# Patient Record
Sex: FEMALE | Race: WHITE | NOT HISPANIC OR LATINO
[De-identification: names, ages, dates, MRNs, and addresses within clinical notes are randomized per-mention and may not be internally consistent; named-entity substitution may affect disease eponyms.]

---

## 2024-02-13 PROBLEM — Z00.00 ENCOUNTER FOR PREVENTIVE HEALTH EXAMINATION: Status: ACTIVE | Noted: 2024-02-13

## 2024-02-14 ENCOUNTER — APPOINTMENT (OUTPATIENT)
Dept: SURGERY | Facility: CLINIC | Age: 74
End: 2024-02-14
Payer: MEDICARE

## 2024-02-14 VITALS
WEIGHT: 120 LBS | TEMPERATURE: 98 F | RESPIRATION RATE: 16 BRPM | DIASTOLIC BLOOD PRESSURE: 78 MMHG | SYSTOLIC BLOOD PRESSURE: 122 MMHG | HEART RATE: 87 BPM | HEIGHT: 66 IN | BODY MASS INDEX: 19.29 KG/M2 | OXYGEN SATURATION: 95 %

## 2024-02-14 DIAGNOSIS — K81.1 CHRONIC CHOLECYSTITIS: ICD-10-CM

## 2024-02-14 PROCEDURE — 99203 OFFICE O/P NEW LOW 30 MIN: CPT

## 2024-02-14 NOTE — REVIEW OF SYSTEMS
[Patient Intake Form Reviewed] : Patient intake form was reviewed [Negative] : Heme/Lymph [FreeTextEntry8] : See HPI

## 2024-02-14 NOTE — RESULTS/DATA
[FreeTextEntry1] : Sono did not demonstrate cholelithiasis or cholecystitis but did show biliary sludge and incidentally noted L renal calculi

## 2024-02-14 NOTE — ADDENDUM
[FreeTextEntry1] : I, Dr. Maksim Hollis, personally performed the evaluation and management (E/M) services for this new patient.  That E/M includes conducting the initial examination, assessing all conditions, and establishing the plan of care.  Today, my ACP, Daniel Lakhani, was here to observe my evaluation and management services for this patient to be followed going forward.

## 2024-02-14 NOTE — ASSESSMENT
[FreeTextEntry1] : 73yoF w/PMHx of celiac disease, s/p L TKR and sustained a fall resulting in R femoral fx (requiring ORIF) and R shoulder injury requiring surgery in 05/2024, referred by Dr. Patterson for evaluation of her RLQ pain.  Pt states that she first experience RLQ 2wks prior that only slightly improved w/omeprazole, was sent by [facility] for abd sono 02/09/2024.  Sono did not demonstrate cholelithiasis or cholecystitis but did show biliary sludge and incidentally noted L renal calculi.  On exam, RUQ tenderness is not appreciated but RLQ tenderness is noted.  By history, pt also reports spontaneous RLQ pain not associated w/eating.  No cholelithiasis or acute cholecystitis appreciated on US.  Would not recommend cholecystectomy at this time as it is not confirmed that her symptoms are related to her gall bladder disease.  Instead, recommended pt f/u w/GI and consider colonoscopy for reevaluation.  She will continue her omeprazole.

## 2024-02-14 NOTE — PHYSICAL EXAM
[Normal] : normal rate and rhythm, carotid and femoral arteries [de-identified] : Reproducible RLQ tenderness, no distention or guarding

## 2024-02-14 NOTE — HISTORY OF PRESENT ILLNESS
[de-identified] : 73yoF w/PMHx of celiac disease, s/p L TKR and sustained a fall resulting in R femoral fx (requiring ORIF) and R shoulder injury requiring surgery in 05/2024, referred by Dr. Patterson for evaluation of her RLQ pain.  Pt states that she first experience RLQ 2wks prior that only slightly improved w/omeprazole, was sent by [facility] for abd sono 02/09/2024.  Sono did not demonstrate cholelithiasis or cholecystitis but did show biliary sludge and incidentally noted L renal calculi.

## 2024-02-20 PROBLEM — K81.1 CHRONIC CHOLECYSTITIS: Status: ACTIVE | Noted: 2024-02-20

## 2024-09-11 ENCOUNTER — RX ONLY (RX ONLY)
Age: 74
End: 2024-09-11

## 2024-09-11 ENCOUNTER — OFFICE (OUTPATIENT)
Dept: URBAN - METROPOLITAN AREA CLINIC 28 | Facility: CLINIC | Age: 74
Setting detail: OPHTHALMOLOGY
End: 2024-09-11
Payer: MEDICARE

## 2024-09-11 DIAGNOSIS — H02.835: ICD-10-CM

## 2024-09-11 DIAGNOSIS — H02.832: ICD-10-CM

## 2024-09-11 DIAGNOSIS — H35.363: ICD-10-CM

## 2024-09-11 DIAGNOSIS — H43.393: ICD-10-CM

## 2024-09-11 DIAGNOSIS — Z96.1: ICD-10-CM

## 2024-09-11 DIAGNOSIS — H16.222: ICD-10-CM

## 2024-09-11 PROCEDURE — 92202 OPSCPY EXTND ON/MAC DRAW: CPT | Performed by: OPHTHALMOLOGY

## 2024-09-11 PROCEDURE — 92004 COMPRE OPH EXAM NEW PT 1/>: CPT | Performed by: OPHTHALMOLOGY

## 2024-09-11 PROCEDURE — 92134 CPTRZ OPH DX IMG PST SGM RTA: CPT | Performed by: OPHTHALMOLOGY

## 2024-09-11 ASSESSMENT — CONFRONTATIONAL VISUAL FIELD TEST (CVF)
OS_FINDINGS: FULL
OD_FINDINGS: FULL

## 2024-09-11 ASSESSMENT — LID POSITION - DERMATOCHALASIS
OS_DERMATOCHALASIS: T
OD_DERMATOCHALASIS: T